# Patient Record
Sex: MALE | Race: BLACK OR AFRICAN AMERICAN | NOT HISPANIC OR LATINO | URBAN - METROPOLITAN AREA
[De-identification: names, ages, dates, MRNs, and addresses within clinical notes are randomized per-mention and may not be internally consistent; named-entity substitution may affect disease eponyms.]

---

## 2020-09-25 ENCOUNTER — LAB OUTSIDE AN ENCOUNTER (OUTPATIENT)
Dept: URBAN - METROPOLITAN AREA CLINIC 109 | Facility: CLINIC | Age: 50
End: 2020-09-25

## 2020-09-25 ENCOUNTER — OFFICE VISIT (OUTPATIENT)
Dept: URBAN - METROPOLITAN AREA CLINIC 109 | Facility: CLINIC | Age: 50
End: 2020-09-25
Payer: COMMERCIAL

## 2020-09-25 DIAGNOSIS — Z86.010 HISTORY OF COLON POLYPS: ICD-10-CM

## 2020-09-25 DIAGNOSIS — Z12.11 COLON CANCER SCREENING: ICD-10-CM

## 2020-09-25 DIAGNOSIS — I10 ESSENTIAL HYPERTENSION: ICD-10-CM

## 2020-09-25 DIAGNOSIS — Z99.2 DEPENDENCE ON RENAL DIALYSIS: ICD-10-CM

## 2020-09-25 DIAGNOSIS — N18.6 END STAGE RENAL DISEASE: ICD-10-CM

## 2020-09-25 PROBLEM — 38341003 HYPERTENSION: Status: ACTIVE | Noted: 2020-09-25

## 2020-09-25 PROBLEM — 105502003: Status: ACTIVE | Noted: 2020-09-25

## 2020-09-25 PROCEDURE — 99203 OFFICE O/P NEW LOW 30 MIN: CPT | Performed by: INTERNAL MEDICINE

## 2020-09-25 RX ORDER — ALLOPURINOL 100 MG/1
1 TABLET TABLET ORAL ONCE A DAY
Status: ACTIVE | COMMUNITY

## 2020-09-25 RX ORDER — LISINOPRIL 5 MG/1
1 TABLET TABLET ORAL ONCE A DAY
Status: ACTIVE | COMMUNITY

## 2020-09-25 NOTE — HPI-TODAY'S VISIT:
Patient has been referred for a screening colonoscopy.  He has a history of colon polyps he believes at his last study in 2007 which was done out of town.  There is no family history of colon polyps or colon cancer.  He denies abdominal pain rectal bleeding or change in bowel habits.  He has lost weight since starting dialysis in March of this year. The patient has end-stage renal disease and is on hemodialysis Mondays Wednesdays and Fridays. He has anemia, likely secondary to his renal disease.  The patient is a renal transplant candidate with his son as a donor.  He has hypertension but no history of cardiac or pulmonary disease.

## 2020-10-06 ENCOUNTER — OFFICE VISIT (OUTPATIENT)
Dept: URBAN - METROPOLITAN AREA MEDICAL CENTER 41 | Facility: MEDICAL CENTER | Age: 50
End: 2020-10-06
Payer: COMMERCIAL

## 2020-10-06 DIAGNOSIS — Z86.010 H/O ADENOMATOUS POLYP OF COLON: ICD-10-CM

## 2020-10-06 PROCEDURE — G9936 PMH PLYP/NEO CO/RECT/JUN/ANS: HCPCS | Performed by: INTERNAL MEDICINE

## 2020-10-06 PROCEDURE — G0105 COLORECTAL SCRN; HI RISK IND: HCPCS | Performed by: INTERNAL MEDICINE

## 2022-03-16 ENCOUNTER — OFFICE VISIT (OUTPATIENT)
Dept: URBAN - METROPOLITAN AREA CLINIC 109 | Facility: CLINIC | Age: 52
End: 2022-03-16
Payer: COMMERCIAL

## 2022-03-16 DIAGNOSIS — R14.0 ABDOMINAL BLOATING: ICD-10-CM

## 2022-03-16 DIAGNOSIS — K92.1 HEMATOCHEZIA: ICD-10-CM

## 2022-03-16 DIAGNOSIS — K57.90 DIVERTICULOSIS: ICD-10-CM

## 2022-03-16 PROCEDURE — 99214 OFFICE O/P EST MOD 30 MIN: CPT | Performed by: INTERNAL MEDICINE

## 2022-03-16 RX ORDER — LISINOPRIL 5 MG/1
1 TABLET TABLET ORAL ONCE A DAY
Status: ON HOLD | COMMUNITY

## 2022-03-16 RX ORDER — ALLOPURINOL 100 MG/1
1 TABLET TABLET ORAL ONCE A DAY
Status: ACTIVE | COMMUNITY

## 2022-03-16 NOTE — HPI-TODAY'S VISIT:
The patient presents for hospital f/u appt.  he has a h/o pan diverticulosis from prior colonoscopy, esrd on dialysis m/w/f, had brbpr ~1 month ago, over 10 episodes while in [place], went to ER there, had egd/colonoscopy, bleeding had subsided.  records not with him but he believes he was told he had diverticular bleed most likely.  no further episodes since then.  reports mild abd discomfort/bloating, denies n/v, pain with meals, etc.

## 2022-08-29 ENCOUNTER — TELEPHONE ENCOUNTER (OUTPATIENT)
Dept: URBAN - METROPOLITAN AREA CLINIC 109 | Facility: CLINIC | Age: 52
End: 2022-08-29

## 2022-09-14 ENCOUNTER — OFFICE VISIT (OUTPATIENT)
Dept: URBAN - METROPOLITAN AREA CLINIC 109 | Facility: CLINIC | Age: 52
End: 2022-09-14
Payer: COMMERCIAL

## 2022-09-14 ENCOUNTER — LAB OUTSIDE AN ENCOUNTER (OUTPATIENT)
Dept: URBAN - METROPOLITAN AREA CLINIC 109 | Facility: CLINIC | Age: 52
End: 2022-09-14

## 2022-09-14 VITALS
WEIGHT: 159.6 LBS | TEMPERATURE: 98.6 F | BODY MASS INDEX: 21.62 KG/M2 | HEIGHT: 72 IN | SYSTOLIC BLOOD PRESSURE: 144 MMHG | HEART RATE: 74 BPM | DIASTOLIC BLOOD PRESSURE: 95 MMHG

## 2022-09-14 DIAGNOSIS — R18.8 OTHER ASCITES: ICD-10-CM

## 2022-09-14 DIAGNOSIS — N18.6 ESRD (END STAGE RENAL DISEASE): ICD-10-CM

## 2022-09-14 DIAGNOSIS — I50.20 SYSTOLIC CONGESTIVE HEART FAILURE, UNSPECIFIED HF CHRONICITY: ICD-10-CM

## 2022-09-14 PROCEDURE — 99214 OFFICE O/P EST MOD 30 MIN: CPT | Performed by: INTERNAL MEDICINE

## 2022-09-14 RX ORDER — CLOPIDOGREL 75 MG/1
1 TABLET TABLET, FILM COATED ORAL ONCE A DAY
Status: ACTIVE | COMMUNITY

## 2022-09-14 RX ORDER — ALLOPURINOL 100 MG/1
1 TABLET TABLET ORAL ONCE A DAY
Status: ACTIVE | COMMUNITY

## 2022-09-14 RX ORDER — LISINOPRIL 5 MG/1
1 TABLET TABLET ORAL ONCE A DAY
Status: ON HOLD | COMMUNITY

## 2022-09-14 NOTE — HPI-TODAY'S VISIT:
The patient presents for evaluation of ascites.  Patient dx with chf in May (EF 15% per pt), also had esrd on dialysis. has had ascites since after May.  he has transient improvement during dialysis but limited on how much fluid can be removed.  he has discomfort related to ascites but no pain.  he does eat out at times and likes popeyes after dialysis (unaware of low sodium diet adherence).

## 2022-09-28 ENCOUNTER — OFFICE VISIT (OUTPATIENT)
Dept: URBAN - METROPOLITAN AREA CLINIC 109 | Facility: CLINIC | Age: 52
End: 2022-09-28

## 2022-09-28 VITALS — HEIGHT: 72 IN

## 2022-09-28 RX ORDER — LISINOPRIL 5 MG/1
1 TABLET TABLET ORAL ONCE A DAY
COMMUNITY

## 2022-09-28 RX ORDER — ALLOPURINOL 100 MG/1
1 TABLET TABLET ORAL ONCE A DAY
COMMUNITY

## 2022-10-26 ENCOUNTER — OFFICE VISIT (OUTPATIENT)
Dept: URBAN - METROPOLITAN AREA CLINIC 109 | Facility: CLINIC | Age: 52
End: 2022-10-26

## 2022-10-26 VITALS — HEIGHT: 72 IN

## 2022-10-26 RX ORDER — ALLOPURINOL 100 MG/1
1 TABLET TABLET ORAL ONCE A DAY
COMMUNITY

## 2022-10-26 RX ORDER — LISINOPRIL 5 MG/1
1 TABLET TABLET ORAL ONCE A DAY
COMMUNITY

## 2022-11-16 ENCOUNTER — OFFICE VISIT (OUTPATIENT)
Dept: URBAN - METROPOLITAN AREA CLINIC 109 | Facility: CLINIC | Age: 52
End: 2022-11-16
Payer: COMMERCIAL

## 2022-11-16 VITALS
SYSTOLIC BLOOD PRESSURE: 139 MMHG | BODY MASS INDEX: 20.97 KG/M2 | DIASTOLIC BLOOD PRESSURE: 89 MMHG | HEART RATE: 84 BPM | WEIGHT: 154.8 LBS | TEMPERATURE: 97.7 F | HEIGHT: 72 IN

## 2022-11-16 DIAGNOSIS — I50.20 SYSTOLIC CONGESTIVE HEART FAILURE, UNSPECIFIED HF CHRONICITY: ICD-10-CM

## 2022-11-16 DIAGNOSIS — K92.1 HEMATOCHEZIA: ICD-10-CM

## 2022-11-16 DIAGNOSIS — R18.8 OTHER ASCITES: ICD-10-CM

## 2022-11-16 DIAGNOSIS — N18.6 ESRD (END STAGE RENAL DISEASE): ICD-10-CM

## 2022-11-16 PROCEDURE — 99214 OFFICE O/P EST MOD 30 MIN: CPT | Performed by: INTERNAL MEDICINE

## 2022-11-16 RX ORDER — CLOPIDOGREL 75 MG/1
1 TABLET TABLET, FILM COATED ORAL ONCE A DAY
Status: ACTIVE | COMMUNITY

## 2022-11-16 RX ORDER — LISINOPRIL 5 MG/1
1 TABLET TABLET ORAL ONCE A DAY
Status: ACTIVE | COMMUNITY

## 2022-11-16 RX ORDER — ALLOPURINOL 100 MG/1
1 TABLET TABLET ORAL ONCE A DAY
Status: ACTIVE | COMMUNITY

## 2022-11-16 NOTE — HPI-TODAY'S VISIT:
The patient presents for f/u appt.  s/p 1L paracentesis, neg cytology, has not had obvious recurrent ascites since then.  on dialysis (M/W/F).  he had self-limited hematochezia  a few weeks ago, he went to ER but went home due to long wait.  lasted a few days, not as severe has episodes in [place].  no recurrent issues since a few weeks ago.  denies abd pain, n/v.

## 2023-01-30 ENCOUNTER — CLAIMS CREATED FROM THE CLAIM WINDOW (OUTPATIENT)
Dept: URBAN - METROPOLITAN AREA CLINIC 109 | Facility: CLINIC | Age: 53
End: 2023-01-30
Payer: COMMERCIAL

## 2023-01-30 ENCOUNTER — WEB ENCOUNTER (OUTPATIENT)
Dept: URBAN - METROPOLITAN AREA CLINIC 109 | Facility: CLINIC | Age: 53
End: 2023-01-30

## 2023-01-30 VITALS
HEIGHT: 71 IN | BODY MASS INDEX: 21.92 KG/M2 | HEART RATE: 70 BPM | SYSTOLIC BLOOD PRESSURE: 139 MMHG | TEMPERATURE: 97.7 F | WEIGHT: 156.6 LBS | DIASTOLIC BLOOD PRESSURE: 89 MMHG

## 2023-01-30 DIAGNOSIS — N18.6 ESRD (END STAGE RENAL DISEASE): ICD-10-CM

## 2023-01-30 DIAGNOSIS — N18.6 END STAGE RENAL DISEASE: ICD-10-CM

## 2023-01-30 DIAGNOSIS — K57.90 DIVERTICULOSIS: ICD-10-CM

## 2023-01-30 DIAGNOSIS — D64.9 ANEMIA, UNSPECIFIED TYPE: ICD-10-CM

## 2023-01-30 DIAGNOSIS — R14.0 ABDOMINAL BLOATING: ICD-10-CM

## 2023-01-30 DIAGNOSIS — R18.8 OTHER ASCITES: ICD-10-CM

## 2023-01-30 DIAGNOSIS — M10.9 GOUT: ICD-10-CM

## 2023-01-30 DIAGNOSIS — I50.20 SYSTOLIC CONGESTIVE HEART FAILURE, UNSPECIFIED HF CHRONICITY: ICD-10-CM

## 2023-01-30 DIAGNOSIS — N18.9 CKD (CHRONIC KIDNEY DISEASE): ICD-10-CM

## 2023-01-30 DIAGNOSIS — K57.30 ACQUIRED DIVERTICULOSIS OF COLON: ICD-10-CM

## 2023-01-30 PROBLEM — 42343007: Status: ACTIVE | Noted: 2022-09-14

## 2023-01-30 PROBLEM — 709044004 CHRONIC KIDNEY DISEASE: Status: ACTIVE | Noted: 2020-09-25

## 2023-01-30 PROBLEM — 46177005: Status: ACTIVE | Noted: 2022-09-14

## 2023-01-30 PROBLEM — 714152005: Status: ACTIVE | Noted: 2020-09-25

## 2023-01-30 PROBLEM — 116289008: Status: ACTIVE | Noted: 2022-03-16

## 2023-01-30 PROBLEM — 90560007 GOUT: Status: ACTIVE | Noted: 2020-09-25

## 2023-01-30 PROCEDURE — 99214 OFFICE O/P EST MOD 30 MIN: CPT

## 2023-01-30 PROCEDURE — 99214 OFFICE O/P EST MOD 30 MIN: CPT | Performed by: INTERNAL MEDICINE

## 2023-01-30 RX ORDER — ALLOPURINOL 100 MG/1
1 TABLET TABLET ORAL ONCE A DAY
Status: ACTIVE | COMMUNITY

## 2023-01-30 RX ORDER — CLOPIDOGREL 75 MG/1
1 TABLET TABLET, FILM COATED ORAL ONCE A DAY
Status: ACTIVE | COMMUNITY

## 2023-01-30 RX ORDER — LISINOPRIL 5 MG/1
1 TABLET TABLET ORAL ONCE A DAY
COMMUNITY

## 2023-01-30 NOTE — HPI-TODAY'S VISIT:
Mr. Correa is a 53 y/o M w/ a significant PMHx who is here for low hemoglobin. Last hemoglobin was 9.3. Pt denies overt GI bleeding. He states that he is here for "low protein" on lab review, albumin is normal. Given that it is unclear why pt is here today, will get labs to eval for anemia and to recheck albumin. Pt is distended with ?ascites. States that this is his new normal abd and that last time he had a paracentesis, his abd did not come down much from what it was prior. Denies SOB.

## 2023-02-03 ENCOUNTER — TELEPHONE ENCOUNTER (OUTPATIENT)
Dept: URBAN - METROPOLITAN AREA CLINIC 109 | Facility: CLINIC | Age: 53
End: 2023-02-03

## 2023-02-23 LAB
A/G RATIO: 1.3
ABSOLUTE BASOPHILS: 30
ABSOLUTE EOSINOPHILS: 152
ABSOLUTE LYMPHOCYTES: 661
ABSOLUTE MONOCYTES: 570
ABSOLUTE NEUTROPHILS: 2386
ALBUMIN: 3.6
ALKALINE PHOSPHATASE: 144
ALT (SGPT): 20
AST (SGOT): 13
BASOPHILS: 0.8
BILIRUBIN, TOTAL: 2.1
BUN/CREATININE RATIO: 4
BUN: 28
CALCIUM: 9.4
CARBON DIOXIDE, TOTAL: 31
CHLORIDE: 99
CREATININE: 7.61
EGFR: 8
EOSINOPHILS: 4
FERRITIN, SERUM: 427
GLOBULIN, TOTAL: 2.7
GLUCOSE: 93
HEMATOCRIT: 42.7
HEMOGLOBIN: 13.6
IRON BIND.CAP.(TIBC): 226
IRON SATURATION: 32
IRON: 73
LYMPHOCYTES: 17.4
MCH: 26
MCHC: 31.9
MCV: 81.6
MONOCYTES: 15
MPV: 11.5
NEUTROPHILS: 62.8
PLATELET COUNT: 204
POTASSIUM: 4
PROTEIN, TOTAL: 6.3
RDW: 16.5
RED BLOOD CELL COUNT: 5.23
SODIUM: 142
WHITE BLOOD CELL COUNT: 3.8

## 2023-02-24 ENCOUNTER — TELEPHONE ENCOUNTER (OUTPATIENT)
Dept: URBAN - METROPOLITAN AREA CLINIC 109 | Facility: CLINIC | Age: 53
End: 2023-02-24

## 2023-03-13 ENCOUNTER — OFFICE VISIT (OUTPATIENT)
Dept: URBAN - METROPOLITAN AREA CLINIC 118 | Facility: CLINIC | Age: 53
End: 2023-03-13
Payer: COMMERCIAL

## 2023-03-13 VITALS
HEART RATE: 74 BPM | TEMPERATURE: 98.8 F | HEIGHT: 71 IN | DIASTOLIC BLOOD PRESSURE: 84 MMHG | BODY MASS INDEX: 20.97 KG/M2 | WEIGHT: 149.8 LBS | SYSTOLIC BLOOD PRESSURE: 129 MMHG

## 2023-03-13 DIAGNOSIS — R18.8 OTHER ASCITES: ICD-10-CM

## 2023-03-13 DIAGNOSIS — D64.89 OTHER SPECIFIED ANEMIAS: ICD-10-CM

## 2023-03-13 DIAGNOSIS — R19.8 PERFORATED VISCUS: ICD-10-CM

## 2023-03-13 DIAGNOSIS — R19.5 LOOSE STOOLS: ICD-10-CM

## 2023-03-13 PROBLEM — 397881000: Status: ACTIVE | Noted: 2022-03-16

## 2023-03-13 PROBLEM — 307496006: Status: ACTIVE | Noted: 2023-03-13

## 2023-03-13 PROCEDURE — 99214 OFFICE O/P EST MOD 30 MIN: CPT | Performed by: INTERNAL MEDICINE

## 2023-03-13 RX ORDER — ALLOPURINOL 100 MG/1
1 TABLET TABLET ORAL ONCE A DAY
Status: ON HOLD | COMMUNITY

## 2023-03-13 RX ORDER — LISINOPRIL 5 MG/1
1 TABLET TABLET ORAL ONCE A DAY
Status: ON HOLD | COMMUNITY

## 2023-03-13 RX ORDER — CLOPIDOGREL 75 MG/1
1 TABLET TABLET, FILM COATED ORAL ONCE A DAY
Status: ON HOLD | COMMUNITY

## 2023-03-13 NOTE — HPI-TODAY'S VISIT:
Mr. Correa is a 53-year-old AAF who presents today for hospital follow-up. He presented to [facility] 2/25/2023 due to abdominal distention and pain.  CT scan showed large volume ascites and perforated viscus could not be ruled out.  Patient was taken to surgery 2/26/2023 for diagnostic laparoscopy with drain placement. Postoperative diagnosis was a likely sealed perforated diverticulitis.  Patient was kept in the hospital until 3/7/2023 due to postop course complicated by ileus. He was hospitalized in [facility] due to a suspected diverticular bleed. Colon showed multiple large, severe extensive pancolonic diverticula containing stool with no bleeding. EGD showed mild, patchy erythematous mucosa in the duodenal bulb.  He denies any current GI bleeding- has had some dark stools but not black. Reports some looser/soft stools since discharge, ~2 per day but denies any watery stools. Has some discomfort on the sides of his abdominal since the surgery, but improving. Denies nausea/vomiting or unintentional weight loss.  Has follow-up with surgery 03/28. Of note, patient previously following with us due to recurrent ascites suspected to be due to CHF (EF 20%) and ESRD on dialysis MWF.  Patient reports no current abdominal swelling/distension.

## 2023-04-06 ENCOUNTER — LAB OUTSIDE AN ENCOUNTER (OUTPATIENT)
Dept: URBAN - METROPOLITAN AREA CLINIC 118 | Facility: CLINIC | Age: 53
End: 2023-04-06

## 2023-04-06 ENCOUNTER — TELEPHONE ENCOUNTER (OUTPATIENT)
Dept: URBAN - METROPOLITAN AREA CLINIC 118 | Facility: CLINIC | Age: 53
End: 2023-04-06

## 2023-04-14 ENCOUNTER — LAB OUTSIDE AN ENCOUNTER (OUTPATIENT)
Dept: URBAN - METROPOLITAN AREA CLINIC 118 | Facility: CLINIC | Age: 53
End: 2023-04-14

## 2023-04-14 ENCOUNTER — LAB OUTSIDE AN ENCOUNTER (OUTPATIENT)
Dept: URBAN - METROPOLITAN AREA CLINIC 94 | Facility: CLINIC | Age: 53
End: 2023-04-14

## 2023-04-14 LAB
CREATININE POC: 9.5
PERFORMING LAB: (no result)

## 2023-04-20 ENCOUNTER — TELEPHONE ENCOUNTER (OUTPATIENT)
Dept: URBAN - METROPOLITAN AREA CLINIC 35 | Facility: CLINIC | Age: 53
End: 2023-04-20

## 2023-05-17 ENCOUNTER — OFFICE VISIT (OUTPATIENT)
Dept: URBAN - METROPOLITAN AREA CLINIC 109 | Facility: CLINIC | Age: 53
End: 2023-05-17
Payer: COMMERCIAL

## 2023-05-17 VITALS
BODY MASS INDEX: 19.1 KG/M2 | TEMPERATURE: 98.1 F | WEIGHT: 136.4 LBS | HEIGHT: 71 IN | HEART RATE: 72 BPM | DIASTOLIC BLOOD PRESSURE: 89 MMHG | SYSTOLIC BLOOD PRESSURE: 144 MMHG

## 2023-05-17 DIAGNOSIS — N18.6 ESRD (END STAGE RENAL DISEASE): ICD-10-CM

## 2023-05-17 DIAGNOSIS — I50.20 SYSTOLIC CONGESTIVE HEART FAILURE, UNSPECIFIED HF CHRONICITY: ICD-10-CM

## 2023-05-17 DIAGNOSIS — R63.4 WEIGHT LOSS: ICD-10-CM

## 2023-05-17 DIAGNOSIS — R18.8 OTHER ASCITES: ICD-10-CM

## 2023-05-17 PROCEDURE — 99214 OFFICE O/P EST MOD 30 MIN: CPT | Performed by: INTERNAL MEDICINE

## 2023-05-17 RX ORDER — ALLOPURINOL 100 MG/1
1 TABLET TABLET ORAL ONCE A DAY
Status: ACTIVE | COMMUNITY

## 2023-05-17 RX ORDER — CLOPIDOGREL 75 MG/1
1 TABLET TABLET, FILM COATED ORAL ONCE A DAY
Status: ACTIVE | COMMUNITY

## 2023-05-17 RX ORDER — LISINOPRIL 5 MG/1
1 TABLET TABLET ORAL ONCE A DAY
Status: ACTIVE | COMMUNITY

## 2023-05-17 NOTE — HPI-TODAY'S VISIT:
The patient presents for follow-up appointment.  He reports continued weight loss despite good appetite.  denies new/worsening abd pain, gi bleeding or n/v.  he had recent prolonged hospitalization due to perforated viscus (? diverticulitis).   denies bowel habit changes/gi bleeding.  h/o esrd on dialysis and chf.  has had recurrent ascites with paracentesis in the past.

## 2023-05-23 ENCOUNTER — OFFICE VISIT (OUTPATIENT)
Dept: URBAN - METROPOLITAN AREA TELEHEALTH 2 | Facility: TELEHEALTH | Age: 53
End: 2023-05-23

## 2023-05-23 RX ORDER — CLOPIDOGREL 75 MG/1
1 TABLET TABLET, FILM COATED ORAL ONCE A DAY
Status: ACTIVE | COMMUNITY

## 2023-05-23 RX ORDER — LISINOPRIL 5 MG/1
1 TABLET TABLET ORAL ONCE A DAY
Status: ACTIVE | COMMUNITY

## 2023-05-23 RX ORDER — ALLOPURINOL 100 MG/1
1 TABLET TABLET ORAL ONCE A DAY
Status: ACTIVE | COMMUNITY

## 2023-06-12 ENCOUNTER — CLAIMS CREATED FROM THE CLAIM WINDOW (OUTPATIENT)
Dept: URBAN - METROPOLITAN AREA MEDICAL CENTER 33 | Facility: MEDICAL CENTER | Age: 53
End: 2023-06-12
Payer: COMMERCIAL

## 2023-06-12 DIAGNOSIS — K92.2 GASTROINTESTINAL HEMORRHAGE, UNSPECIFIED: ICD-10-CM

## 2023-06-12 DIAGNOSIS — Z79.02 ANTIPLATELET OR ANTITHROMBOTIC LONG-TERM USE: ICD-10-CM

## 2023-06-12 DIAGNOSIS — K92.1 ACUTE MELENA: ICD-10-CM

## 2023-06-12 PROCEDURE — 99233 SBSQ HOSP IP/OBS HIGH 50: CPT | Performed by: PHYSICIAN ASSISTANT

## 2023-06-12 PROCEDURE — 99233 SBSQ HOSP IP/OBS HIGH 50: CPT | Performed by: INTERNAL MEDICINE

## 2023-06-12 PROCEDURE — 99223 1ST HOSP IP/OBS HIGH 75: CPT | Performed by: INTERNAL MEDICINE

## 2023-06-12 PROCEDURE — G8427 DOCREV CUR MEDS BY ELIG CLIN: HCPCS | Performed by: INTERNAL MEDICINE

## 2023-06-12 PROCEDURE — 99254 IP/OBS CNSLTJ NEW/EST MOD 60: CPT | Performed by: PHYSICIAN ASSISTANT

## 2023-06-13 ENCOUNTER — CLAIMS CREATED FROM THE CLAIM WINDOW (OUTPATIENT)
Dept: URBAN - METROPOLITAN AREA MEDICAL CENTER 33 | Facility: MEDICAL CENTER | Age: 53
End: 2023-06-13
Payer: COMMERCIAL

## 2023-06-13 DIAGNOSIS — K92.1 ACUTE MELENA: ICD-10-CM

## 2023-06-13 DIAGNOSIS — K92.2 GASTROINTESTINAL HEMORRHAGE, UNSPECIFIED: ICD-10-CM

## 2023-06-13 DIAGNOSIS — R74.01 ABNORMAL/ELEVATED TRANSAMINASE (SGOT, AMINOTRANSFERASE): ICD-10-CM

## 2023-06-13 DIAGNOSIS — R79.89 ABNORMAL BILIRUBIN TEST: ICD-10-CM

## 2023-06-13 PROCEDURE — 99232 SBSQ HOSP IP/OBS MODERATE 35: CPT | Performed by: PHYSICIAN ASSISTANT

## 2023-06-13 PROCEDURE — 99232 SBSQ HOSP IP/OBS MODERATE 35: CPT | Performed by: INTERNAL MEDICINE

## 2023-06-14 ENCOUNTER — CLAIMS CREATED FROM THE CLAIM WINDOW (OUTPATIENT)
Dept: URBAN - METROPOLITAN AREA MEDICAL CENTER 33 | Facility: MEDICAL CENTER | Age: 53
End: 2023-06-14
Payer: COMMERCIAL

## 2023-06-14 ENCOUNTER — OFFICE VISIT (OUTPATIENT)
Dept: URBAN - METROPOLITAN AREA CLINIC 109 | Facility: CLINIC | Age: 53
End: 2023-06-14

## 2023-06-14 DIAGNOSIS — K92.2 GASTROINTESTINAL HEMORRHAGE, UNSPECIFIED: ICD-10-CM

## 2023-06-14 DIAGNOSIS — K92.1 ACUTE MELENA: ICD-10-CM

## 2023-06-14 DIAGNOSIS — D12.2 ADENOMA OF ASCENDING COLON: ICD-10-CM

## 2023-06-14 PROCEDURE — 45380 COLONOSCOPY AND BIOPSY: CPT | Performed by: INTERNAL MEDICINE

## 2023-06-14 PROCEDURE — 45381 COLONOSCOPY SUBMUCOUS NJX: CPT | Performed by: INTERNAL MEDICINE

## 2023-06-14 PROCEDURE — 45378 DIAGNOSTIC COLONOSCOPY: CPT | Performed by: INTERNAL MEDICINE

## 2023-06-14 RX ORDER — LISINOPRIL 5 MG/1
1 TABLET TABLET ORAL ONCE A DAY
COMMUNITY

## 2023-06-14 RX ORDER — CLOPIDOGREL 75 MG/1
1 TABLET TABLET, FILM COATED ORAL ONCE A DAY
COMMUNITY

## 2023-06-14 RX ORDER — ALLOPURINOL 100 MG/1
1 TABLET TABLET ORAL ONCE A DAY
COMMUNITY

## 2023-06-15 ENCOUNTER — CLAIMS CREATED FROM THE CLAIM WINDOW (OUTPATIENT)
Dept: URBAN - METROPOLITAN AREA MEDICAL CENTER 33 | Facility: MEDICAL CENTER | Age: 53
End: 2023-06-15
Payer: COMMERCIAL

## 2023-06-15 DIAGNOSIS — K92.2 GASTROINTESTINAL HEMORRHAGE, UNSPECIFIED: ICD-10-CM

## 2023-06-15 DIAGNOSIS — R79.89 ABNORMAL BILIRUBIN TEST: ICD-10-CM

## 2023-06-15 DIAGNOSIS — R74.01 ABNORMAL/ELEVATED TRANSAMINASE (SGOT, AMINOTRANSFERASE): ICD-10-CM

## 2023-06-15 DIAGNOSIS — K92.1 ACUTE MELENA: ICD-10-CM

## 2023-06-15 PROCEDURE — 99232 SBSQ HOSP IP/OBS MODERATE 35: CPT | Performed by: INTERNAL MEDICINE

## 2023-08-16 ENCOUNTER — DASHBOARD ENCOUNTERS (OUTPATIENT)
Age: 53
End: 2023-08-16

## 2023-08-16 ENCOUNTER — LAB OUTSIDE AN ENCOUNTER (OUTPATIENT)
Dept: URBAN - METROPOLITAN AREA CLINIC 109 | Facility: CLINIC | Age: 53
End: 2023-08-16

## 2023-08-16 ENCOUNTER — OFFICE VISIT (OUTPATIENT)
Dept: URBAN - METROPOLITAN AREA CLINIC 109 | Facility: CLINIC | Age: 53
End: 2023-08-16
Payer: COMMERCIAL

## 2023-08-16 VITALS
BODY MASS INDEX: 19.52 KG/M2 | HEIGHT: 71 IN | HEART RATE: 69 BPM | TEMPERATURE: 98.2 F | DIASTOLIC BLOOD PRESSURE: 85 MMHG | WEIGHT: 139.4 LBS | SYSTOLIC BLOOD PRESSURE: 147 MMHG

## 2023-08-16 DIAGNOSIS — D12.2 ADENOMATOUS POLYP OF ASCENDING COLON: ICD-10-CM

## 2023-08-16 DIAGNOSIS — R18.8 OTHER ASCITES: ICD-10-CM

## 2023-08-16 DIAGNOSIS — K92.1 HEMATOCHEZIA: ICD-10-CM

## 2023-08-16 DIAGNOSIS — N18.6 ESRD (END STAGE RENAL DISEASE): ICD-10-CM

## 2023-08-16 PROBLEM — 389026000: Status: ACTIVE | Noted: 2022-09-14

## 2023-08-16 PROBLEM — 428054006: Status: ACTIVE | Noted: 2023-08-16

## 2023-08-16 PROBLEM — 449341000124102: Status: ACTIVE | Noted: 2022-11-16

## 2023-08-16 PROCEDURE — 99214 OFFICE O/P EST MOD 30 MIN: CPT | Performed by: INTERNAL MEDICINE

## 2023-08-16 RX ORDER — LISINOPRIL 5 MG/1
1 TABLET TABLET ORAL ONCE A DAY
Status: ACTIVE | COMMUNITY

## 2023-08-16 RX ORDER — ALLOPURINOL 100 MG/1
1 TABLET TABLET ORAL ONCE A DAY
Status: ACTIVE | COMMUNITY

## 2023-08-16 RX ORDER — BISACODYL 5 MG
3 TABLET, DELAYED RELEASE (ENTERIC COATED) ORAL
Qty: 3 | Refills: 0 | OUTPATIENT
Start: 2023-08-16 | End: 2023-08-17

## 2023-08-16 RX ORDER — POLYETHYLENE GLYCOL 3350, SODIUM SULFATE ANHYDROUS, SODIUM BICARBONATE, SODIUM CHLORIDE, POTASSIUM CHLORIDE 236; 22.74; 6.74; 5.86; 2.97 G/4L; G/4L; G/4L; G/4L; G/4L
4000 ML POWDER, FOR SOLUTION ORAL ONCE
Qty: 1 | Refills: 0 | OUTPATIENT
Start: 2023-08-16 | End: 2023-08-17

## 2023-08-16 RX ORDER — CLOPIDOGREL 75 MG/1
1 TABLET TABLET, FILM COATED ORAL ONCE A DAY
Status: ACTIVE | COMMUNITY

## 2023-08-16 NOTE — HPI-TODAY'S VISIT:
The patient presents for hospital f/u appt.  pt was in the hospital in June at [facility] with hematochezia.  had colonoscopy which showed diverticulosis and an ascending colon polyp which was biopsied and tattoo'd but not removed.  bleeding felt to be diverticular.  no further bleeding since hospital discharge.  no longer taking plavix.  denies h/o cad/pvd.  pt has h/o esrd on dialysis.  has had ascites (felt to be nephrogenic and/or cardiac) with paracentesis in the past.  weight stable, denies abd pain.

## 2023-10-23 ENCOUNTER — CLAIMS CREATED FROM THE CLAIM WINDOW (OUTPATIENT)
Dept: URBAN - METROPOLITAN AREA MEDICAL CENTER 16 | Facility: MEDICAL CENTER | Age: 53
End: 2023-10-23
Payer: COMMERCIAL

## 2023-10-23 ENCOUNTER — CLAIMS CREATED FROM THE CLAIM WINDOW (OUTPATIENT)
Dept: URBAN - METROPOLITAN AREA MEDICAL CENTER 16 | Facility: MEDICAL CENTER | Age: 53
End: 2023-10-23

## 2023-10-23 DIAGNOSIS — K63.89 APPENDICITIS EPIPLOICA: ICD-10-CM

## 2023-10-23 DIAGNOSIS — D12.0 ADENOMA OF CECUM: ICD-10-CM

## 2023-10-23 DIAGNOSIS — K63.5 BENIGN COLON POLYP: ICD-10-CM

## 2023-10-23 PROCEDURE — 45380 COLONOSCOPY AND BIOPSY: CPT | Performed by: INTERNAL MEDICINE

## 2023-10-23 PROCEDURE — 45385 COLONOSCOPY W/LESION REMOVAL: CPT | Performed by: INTERNAL MEDICINE

## 2023-10-23 RX ORDER — CLOPIDOGREL 75 MG/1
1 TABLET TABLET, FILM COATED ORAL ONCE A DAY
Status: ACTIVE | COMMUNITY

## 2023-10-23 RX ORDER — LISINOPRIL 5 MG/1
1 TABLET TABLET ORAL ONCE A DAY
Status: ACTIVE | COMMUNITY

## 2023-10-23 RX ORDER — ALLOPURINOL 100 MG/1
1 TABLET TABLET ORAL ONCE A DAY
Status: ACTIVE | COMMUNITY

## 2024-12-04 ENCOUNTER — OFFICE VISIT (OUTPATIENT)
Dept: URBAN - METROPOLITAN AREA CLINIC 109 | Facility: CLINIC | Age: 54
End: 2024-12-04

## 2024-12-04 RX ORDER — CLOPIDOGREL 75 MG/1
1 TABLET TABLET, FILM COATED ORAL ONCE A DAY
COMMUNITY

## 2024-12-04 RX ORDER — ALLOPURINOL 100 MG/1
1 TABLET TABLET ORAL ONCE A DAY
COMMUNITY

## 2024-12-04 RX ORDER — LISINOPRIL 5 MG/1
1 TABLET TABLET ORAL ONCE A DAY
COMMUNITY

## 2025-01-15 ENCOUNTER — LAB OUTSIDE AN ENCOUNTER (OUTPATIENT)
Dept: URBAN - METROPOLITAN AREA CLINIC 109 | Facility: CLINIC | Age: 55
End: 2025-01-15

## 2025-01-15 ENCOUNTER — OFFICE VISIT (OUTPATIENT)
Dept: URBAN - METROPOLITAN AREA CLINIC 109 | Facility: CLINIC | Age: 55
End: 2025-01-15
Payer: COMMERCIAL

## 2025-01-15 VITALS
HEART RATE: 82 BPM | HEIGHT: 71 IN | SYSTOLIC BLOOD PRESSURE: 151 MMHG | DIASTOLIC BLOOD PRESSURE: 85 MMHG | BODY MASS INDEX: 21.42 KG/M2 | WEIGHT: 153 LBS

## 2025-01-15 DIAGNOSIS — K57.92 ACUTE DIVERTICULITIS: ICD-10-CM

## 2025-01-15 DIAGNOSIS — Z86.0101 PERSONAL HISTORY OF ADENOMATOUS AND SERRATED COLON POLYPS: ICD-10-CM

## 2025-01-15 DIAGNOSIS — R18.8 OTHER ASCITES: ICD-10-CM

## 2025-01-15 DIAGNOSIS — N18.6 ESRD (END STAGE RENAL DISEASE): ICD-10-CM

## 2025-01-15 PROBLEM — 735593008: Status: ACTIVE | Noted: 2025-01-15

## 2025-01-15 PROBLEM — 428283002: Status: ACTIVE | Noted: 2025-01-15

## 2025-01-15 PROCEDURE — 99214 OFFICE O/P EST MOD 30 MIN: CPT | Performed by: INTERNAL MEDICINE

## 2025-01-15 RX ORDER — POLYETHYLENE GLYCOL 3350, SODIUM SULFATE ANHYDROUS, SODIUM BICARBONATE, SODIUM CHLORIDE, POTASSIUM CHLORIDE 236; 22.74; 6.74; 5.86; 2.97 G/4L; G/4L; G/4L; G/4L; G/4L
4000 ML POWDER, FOR SOLUTION ORAL ONCE
Qty: 1 | Refills: 0 | OUTPATIENT
Start: 2025-01-15 | End: 2025-01-16

## 2025-01-15 RX ORDER — LISINOPRIL 5 MG/1
1 TABLET TABLET ORAL ONCE A DAY
Status: ACTIVE | COMMUNITY

## 2025-01-15 RX ORDER — BISACODYL 5 MG
3 TABLET, DELAYED RELEASE (ENTERIC COATED) ORAL
Qty: 3 | Refills: 0 | OUTPATIENT
Start: 2025-01-15 | End: 2025-01-16

## 2025-01-15 RX ORDER — ALLOPURINOL 100 MG/1
1 TABLET TABLET ORAL ONCE A DAY
Status: ACTIVE | COMMUNITY

## 2025-01-15 NOTE — HPI-TODAY'S VISIT:
The patient presents for f/u appt.  pt had colonoscopy 2023 with piecemeal resection of proximal colon/IC valve polyp (adenomatous).  6-12 month recommendation.  he had recurrent acute diverticulitis episodes last year, most recently treated at hospital in November.  uncomplicated and treated with abx.  abd pain has been resolved for 6-8 weeks.  denies gi bleeding.  h/o ascites, felt to be nephrogenic/cardiac.  had ct scan last year (reviewed) without signs of cirrhosis.  h/o chf, EF 25%, does not have aicd.  sees AHA.

## 2025-03-04 ENCOUNTER — OFFICE VISIT (OUTPATIENT)
Dept: URBAN - METROPOLITAN AREA CLINIC 92 | Facility: CLINIC | Age: 55
End: 2025-03-04

## 2025-03-07 ENCOUNTER — OFFICE VISIT (OUTPATIENT)
Dept: URBAN - METROPOLITAN AREA CLINIC 92 | Facility: CLINIC | Age: 55
End: 2025-03-07

## 2025-03-07 RX ORDER — LISINOPRIL 5 MG/1
1 TABLET TABLET ORAL ONCE A DAY
Status: ACTIVE | COMMUNITY

## 2025-03-07 RX ORDER — ALLOPURINOL 100 MG/1
1 TABLET TABLET ORAL ONCE A DAY
Status: ACTIVE | COMMUNITY

## 2025-03-31 ENCOUNTER — TELEPHONE ENCOUNTER (OUTPATIENT)
Dept: URBAN - METROPOLITAN AREA CLINIC 109 | Facility: CLINIC | Age: 55
End: 2025-03-31

## 2025-03-31 ENCOUNTER — OFFICE VISIT (OUTPATIENT)
Dept: URBAN - METROPOLITAN AREA MEDICAL CENTER 16 | Facility: MEDICAL CENTER | Age: 55
End: 2025-03-31

## 2025-03-31 RX ORDER — LISINOPRIL 5 MG/1
1 TABLET TABLET ORAL ONCE A DAY
Status: ACTIVE | COMMUNITY

## 2025-03-31 RX ORDER — ALLOPURINOL 100 MG/1
1 TABLET TABLET ORAL ONCE A DAY
Status: ACTIVE | COMMUNITY

## 2025-07-16 ENCOUNTER — OFFICE VISIT (OUTPATIENT)
Dept: URBAN - METROPOLITAN AREA CLINIC 109 | Facility: CLINIC | Age: 55
End: 2025-07-16